# Patient Record
Sex: FEMALE | Race: WHITE | Employment: FULL TIME | URBAN - METROPOLITAN AREA
[De-identification: names, ages, dates, MRNs, and addresses within clinical notes are randomized per-mention and may not be internally consistent; named-entity substitution may affect disease eponyms.]

---

## 2019-11-19 LAB
HBV SURFACE AG SERPL QL IA: NEGATIVE
HIV 1+2 AB+HIV1 P24 AG SERPL QL IA: NEGATIVE
RUBELLA ABY IGG: NORMAL

## 2020-03-23 LAB — GLU GEST SCREEN 1HR 50G: 78

## 2020-05-26 ENCOUNTER — TRANSFERRED RECORDS (OUTPATIENT)
Dept: HEALTH INFORMATION MANAGEMENT | Facility: CLINIC | Age: 25
End: 2020-05-26

## 2020-05-26 ENCOUNTER — RECORDS - HEALTHEAST (OUTPATIENT)
Dept: LAB | Facility: CLINIC | Age: 25
End: 2020-05-26

## 2020-05-26 LAB — GROUP B STREP PCR: NEGATIVE

## 2020-05-27 LAB
ALLERGIC TO PENICILLIN: YES
GP B STREP DNA SPEC QL NAA+PROBE: NEGATIVE

## 2020-06-24 ENCOUNTER — ANESTHESIA (OUTPATIENT)
Dept: OBGYN | Facility: CLINIC | Age: 25
End: 2020-06-24
Payer: COMMERCIAL

## 2020-06-24 ENCOUNTER — ANESTHESIA EVENT (OUTPATIENT)
Dept: OBGYN | Facility: CLINIC | Age: 25
End: 2020-06-24
Payer: COMMERCIAL

## 2020-06-24 ENCOUNTER — HOSPITAL ENCOUNTER (INPATIENT)
Facility: CLINIC | Age: 25
LOS: 2 days | Discharge: HOME HEALTH | End: 2020-06-26
Attending: MIDWIFE | Admitting: MIDWIFE
Payer: COMMERCIAL

## 2020-06-24 DIAGNOSIS — O34.219 VBAC (VAGINAL BIRTH AFTER CESAREAN): ICD-10-CM

## 2020-06-24 DIAGNOSIS — D62 ANEMIA DUE TO BLOOD LOSS, ACUTE: Primary | ICD-10-CM

## 2020-06-24 PROBLEM — Z67.91 RH NEGATIVE STATUS DURING PREGNANCY: Status: ACTIVE | Noted: 2020-06-24

## 2020-06-24 PROBLEM — O26.899 RH NEGATIVE STATUS DURING PREGNANCY: Status: ACTIVE | Noted: 2020-06-24

## 2020-06-24 PROBLEM — O09.90 HIGH-RISK PREGNANCY, UNSPECIFIED TRIMESTER: Status: ACTIVE | Noted: 2020-06-24

## 2020-06-24 LAB
ABO + RH BLD: ABNORMAL
ABO + RH BLD: ABNORMAL
BASOPHILS # BLD AUTO: 0 10E9/L (ref 0–0.2)
BASOPHILS NFR BLD AUTO: 0.2 %
BLD GP AB INVEST PLASRBC-IMP: ABNORMAL
BLD GP AB SCN SERPL QL: ABNORMAL
BLOOD BANK CMNT PATIENT-IMP: ABNORMAL
BLOOD BANK CMNT PATIENT-IMP: ABNORMAL
BLOOD BANK CMNT PATIENT-IMP: NORMAL
DIFFERENTIAL METHOD BLD: NORMAL
EOSINOPHIL # BLD AUTO: 0.1 10E9/L (ref 0–0.7)
EOSINOPHIL NFR BLD AUTO: 1 %
ERYTHROCYTE [DISTWIDTH] IN BLOOD BY AUTOMATED COUNT: 13.1 % (ref 10–15)
ERYTHROCYTE [DISTWIDTH] IN BLOOD BY AUTOMATED COUNT: 13.2 % (ref 10–15)
HCT VFR BLD AUTO: 34.3 % (ref 35–47)
HCT VFR BLD AUTO: 39.9 % (ref 35–47)
HGB BLD-MCNC: 11.3 G/DL (ref 11.7–15.7)
HGB BLD-MCNC: 13.4 G/DL (ref 11.7–15.7)
IMM GRANULOCYTES # BLD: 0.1 10E9/L (ref 0–0.4)
IMM GRANULOCYTES NFR BLD: 0.5 %
LYMPHOCYTES # BLD AUTO: 2 10E9/L (ref 0.8–5.3)
LYMPHOCYTES NFR BLD AUTO: 19.5 %
MCH RBC QN AUTO: 29.7 PG (ref 26.5–33)
MCH RBC QN AUTO: 30.1 PG (ref 26.5–33)
MCHC RBC AUTO-ENTMCNC: 32.9 G/DL (ref 31.5–36.5)
MCHC RBC AUTO-ENTMCNC: 33.6 G/DL (ref 31.5–36.5)
MCV RBC AUTO: 89 FL (ref 78–100)
MCV RBC AUTO: 92 FL (ref 78–100)
MONOCYTES # BLD AUTO: 0.8 10E9/L (ref 0–1.3)
MONOCYTES NFR BLD AUTO: 7.5 %
NEUTROPHILS # BLD AUTO: 7.3 10E9/L (ref 1.6–8.3)
NEUTROPHILS NFR BLD AUTO: 71.3 %
NRBC # BLD AUTO: 0 10*3/UL
NRBC BLD AUTO-RTO: 0 /100
PLATELET # BLD AUTO: 147 10E9/L (ref 150–450)
PLATELET # BLD AUTO: 165 10E9/L (ref 150–450)
RBC # BLD AUTO: 3.75 10E12/L (ref 3.8–5.2)
RBC # BLD AUTO: 4.51 10E12/L (ref 3.8–5.2)
SARS-COV-2 PCR COMMENT: NORMAL
SARS-COV-2 RNA SPEC QL NAA+PROBE: NEGATIVE
SARS-COV-2 RNA SPEC QL NAA+PROBE: NORMAL
SPECIMEN EXP DATE BLD: ABNORMAL
SPECIMEN SOURCE: NORMAL
SPECIMEN SOURCE: NORMAL
T PALLIDUM AB SER QL: NONREACTIVE
WBC # BLD AUTO: 10.2 10E9/L (ref 4–11)
WBC # BLD AUTO: 16.1 10E9/L (ref 4–11)

## 2020-06-24 PROCEDURE — 86870 RBC ANTIBODY IDENTIFICATION: CPT | Performed by: MIDWIFE

## 2020-06-24 PROCEDURE — 72200001 ZZH LABOR CARE VAGINAL DELIVERY SINGLE

## 2020-06-24 PROCEDURE — 0HQ9XZZ REPAIR PERINEUM SKIN, EXTERNAL APPROACH: ICD-10-PCS | Performed by: ADVANCED PRACTICE MIDWIFE

## 2020-06-24 PROCEDURE — 10907ZC DRAINAGE OF AMNIOTIC FLUID, THERAPEUTIC FROM PRODUCTS OF CONCEPTION, VIA NATURAL OR ARTIFICIAL OPENING: ICD-10-PCS | Performed by: ADVANCED PRACTICE MIDWIFE

## 2020-06-24 PROCEDURE — 36415 COLL VENOUS BLD VENIPUNCTURE: CPT | Performed by: ADVANCED PRACTICE MIDWIFE

## 2020-06-24 PROCEDURE — 85025 COMPLETE CBC W/AUTO DIFF WBC: CPT | Performed by: MIDWIFE

## 2020-06-24 PROCEDURE — 86900 BLOOD TYPING SEROLOGIC ABO: CPT | Performed by: MIDWIFE

## 2020-06-24 PROCEDURE — 86901 BLOOD TYPING SEROLOGIC RH(D): CPT | Performed by: MIDWIFE

## 2020-06-24 PROCEDURE — 40000977 ZZH STATISTIC ATTENDANCE AT DELIVERY

## 2020-06-24 PROCEDURE — U0003 INFECTIOUS AGENT DETECTION BY NUCLEIC ACID (DNA OR RNA); SEVERE ACUTE RESPIRATORY SYNDROME CORONAVIRUS 2 (SARS-COV-2) (CORONAVIRUS DISEASE [COVID-19]), AMPLIFIED PROBE TECHNIQUE, MAKING USE OF HIGH THROUGHPUT TECHNOLOGIES AS DESCRIBED BY CMS-2020-01-R: HCPCS | Performed by: MIDWIFE

## 2020-06-24 PROCEDURE — 25000128 H RX IP 250 OP 636

## 2020-06-24 PROCEDURE — 12000001 ZZH R&B MED SURG/OB UMMC

## 2020-06-24 PROCEDURE — 25800030 ZZH RX IP 258 OP 636: Performed by: ADVANCED PRACTICE MIDWIFE

## 2020-06-24 PROCEDURE — 85027 COMPLETE CBC AUTOMATED: CPT | Performed by: ADVANCED PRACTICE MIDWIFE

## 2020-06-24 PROCEDURE — 25000125 ZZHC RX 250

## 2020-06-24 PROCEDURE — 86780 TREPONEMA PALLIDUM: CPT | Performed by: MIDWIFE

## 2020-06-24 PROCEDURE — 86850 RBC ANTIBODY SCREEN: CPT | Performed by: MIDWIFE

## 2020-06-24 PROCEDURE — 00HU33Z INSERTION OF INFUSION DEVICE INTO SPINAL CANAL, PERCUTANEOUS APPROACH: ICD-10-PCS | Performed by: ANESTHESIOLOGY

## 2020-06-24 PROCEDURE — 25000132 ZZH RX MED GY IP 250 OP 250 PS 637: Performed by: MIDWIFE

## 2020-06-24 PROCEDURE — 3E0R3BZ INTRODUCTION OF ANESTHETIC AGENT INTO SPINAL CANAL, PERCUTANEOUS APPROACH: ICD-10-PCS | Performed by: ANESTHESIOLOGY

## 2020-06-24 PROCEDURE — 25800030 ZZH RX IP 258 OP 636: Performed by: MIDWIFE

## 2020-06-24 PROCEDURE — 25000125 ZZHC RX 250: Performed by: ANESTHESIOLOGY

## 2020-06-24 PROCEDURE — 36415 COLL VENOUS BLD VENIPUNCTURE: CPT | Performed by: MIDWIFE

## 2020-06-24 RX ORDER — AMOXICILLIN 250 MG
2 CAPSULE ORAL 2 TIMES DAILY
Status: DISCONTINUED | OUTPATIENT
Start: 2020-06-24 | End: 2020-06-26 | Stop reason: HOSPADM

## 2020-06-24 RX ORDER — OXYTOCIN/0.9 % SODIUM CHLORIDE 30/500 ML
PLASTIC BAG, INJECTION (ML) INTRAVENOUS
Status: COMPLETED
Start: 2020-06-24 | End: 2020-06-24

## 2020-06-24 RX ORDER — HYDROXYZINE HYDROCHLORIDE 50 MG/1
150 TABLET, FILM COATED ORAL EVERY 6 HOURS PRN
Status: DISCONTINUED | OUTPATIENT
Start: 2020-06-24 | End: 2020-06-24

## 2020-06-24 RX ORDER — CARBOPROST TROMETHAMINE 250 UG/ML
250 INJECTION, SOLUTION INTRAMUSCULAR
Status: DISCONTINUED | OUTPATIENT
Start: 2020-06-24 | End: 2020-06-24

## 2020-06-24 RX ORDER — ACETAMINOPHEN 325 MG/1
650 TABLET ORAL EVERY 4 HOURS PRN
Status: DISCONTINUED | OUTPATIENT
Start: 2020-06-24 | End: 2020-06-26 | Stop reason: HOSPADM

## 2020-06-24 RX ORDER — NALOXONE HYDROCHLORIDE 0.4 MG/ML
.1-.4 INJECTION, SOLUTION INTRAMUSCULAR; INTRAVENOUS; SUBCUTANEOUS
Status: DISCONTINUED | OUTPATIENT
Start: 2020-06-24 | End: 2020-06-26 | Stop reason: HOSPADM

## 2020-06-24 RX ORDER — OXYTOCIN 10 [USP'U]/ML
10 INJECTION, SOLUTION INTRAMUSCULAR; INTRAVENOUS
Status: DISCONTINUED | OUTPATIENT
Start: 2020-06-24 | End: 2020-06-24

## 2020-06-24 RX ORDER — SODIUM CHLORIDE, SODIUM LACTATE, POTASSIUM CHLORIDE, CALCIUM CHLORIDE 600; 310; 30; 20 MG/100ML; MG/100ML; MG/100ML; MG/100ML
INJECTION, SOLUTION INTRAVENOUS CONTINUOUS
Status: DISCONTINUED | OUTPATIENT
Start: 2020-06-24 | End: 2020-06-24

## 2020-06-24 RX ORDER — NALOXONE HYDROCHLORIDE 0.4 MG/ML
.1-.4 INJECTION, SOLUTION INTRAMUSCULAR; INTRAVENOUS; SUBCUTANEOUS
Status: DISCONTINUED | OUTPATIENT
Start: 2020-06-24 | End: 2020-06-24

## 2020-06-24 RX ORDER — OXYCODONE AND ACETAMINOPHEN 5; 325 MG/1; MG/1
1 TABLET ORAL
Status: DISCONTINUED | OUTPATIENT
Start: 2020-06-24 | End: 2020-06-24

## 2020-06-24 RX ORDER — IBUPROFEN 800 MG/1
800 TABLET, FILM COATED ORAL
Status: COMPLETED | OUTPATIENT
Start: 2020-06-24 | End: 2020-06-24

## 2020-06-24 RX ORDER — FENTANYL/BUPIVACAINE/NS/PF 2-1250MCG
PLASTIC BAG, INJECTION (ML) INJECTION
Status: COMPLETED
Start: 2020-06-24 | End: 2020-06-24

## 2020-06-24 RX ORDER — LIDOCAINE HYDROCHLORIDE 10 MG/ML
INJECTION, SOLUTION EPIDURAL; INFILTRATION; INTRACAUDAL; PERINEURAL
Status: COMPLETED
Start: 2020-06-24 | End: 2020-06-24

## 2020-06-24 RX ORDER — OXYTOCIN/0.9 % SODIUM CHLORIDE 30/500 ML
340 PLASTIC BAG, INJECTION (ML) INTRAVENOUS CONTINUOUS PRN
Status: DISCONTINUED | OUTPATIENT
Start: 2020-06-24 | End: 2020-06-26 | Stop reason: HOSPADM

## 2020-06-24 RX ORDER — ONDANSETRON 2 MG/ML
4 INJECTION INTRAMUSCULAR; INTRAVENOUS EVERY 6 HOURS PRN
Status: DISCONTINUED | OUTPATIENT
Start: 2020-06-24 | End: 2020-06-24

## 2020-06-24 RX ORDER — AMOXICILLIN 250 MG
1 CAPSULE ORAL 2 TIMES DAILY
Status: DISCONTINUED | OUTPATIENT
Start: 2020-06-24 | End: 2020-06-26 | Stop reason: HOSPADM

## 2020-06-24 RX ORDER — NALBUPHINE HYDROCHLORIDE 20 MG/ML
2.5-5 INJECTION, SOLUTION INTRAMUSCULAR; INTRAVENOUS; SUBCUTANEOUS EVERY 6 HOURS PRN
Status: DISCONTINUED | OUTPATIENT
Start: 2020-06-24 | End: 2020-06-24

## 2020-06-24 RX ORDER — EPHEDRINE SULFATE 50 MG/ML
5 INJECTION, SOLUTION INTRAMUSCULAR; INTRAVENOUS; SUBCUTANEOUS
Status: DISCONTINUED | OUTPATIENT
Start: 2020-06-24 | End: 2020-06-24

## 2020-06-24 RX ORDER — BISACODYL 10 MG
10 SUPPOSITORY, RECTAL RECTAL DAILY PRN
Status: DISCONTINUED | OUTPATIENT
Start: 2020-06-26 | End: 2020-06-26 | Stop reason: HOSPADM

## 2020-06-24 RX ORDER — LIDOCAINE HYDROCHLORIDE AND EPINEPHRINE 15; 5 MG/ML; UG/ML
INJECTION, SOLUTION EPIDURAL PRN
Status: DISCONTINUED | OUTPATIENT
Start: 2020-06-24 | End: 2020-06-24 | Stop reason: HOSPADM

## 2020-06-24 RX ORDER — MISOPROSTOL 200 UG/1
TABLET ORAL
Status: DISCONTINUED
Start: 2020-06-24 | End: 2020-06-24 | Stop reason: HOSPADM

## 2020-06-24 RX ORDER — OXYTOCIN/0.9 % SODIUM CHLORIDE 30/500 ML
100 PLASTIC BAG, INJECTION (ML) INTRAVENOUS CONTINUOUS
Status: DISCONTINUED | OUTPATIENT
Start: 2020-06-24 | End: 2020-06-26 | Stop reason: HOSPADM

## 2020-06-24 RX ORDER — VITAMIN A ACETATE, .BETA.-CAROTENE, ASCORBIC ACID, CHOLECALCIFEROL, .ALPHA.-TOCOPHEROL ACETATE, DL-, THIAMINE MONONITRATE, RIBOFLAVIN, NIACINAMIDE, PYRIDOXINE HYDROCHLORIDE, FOLIC ACID, CYANOCOBALAMIN, CALCIUM CARBONATE, FERROUS FUMARATE, ZINC OXIDE, AND CUPRIC OXIDE 2000; 2000; 120; 400; 22; 1.84; 3; 20; 10; 1; 12; 200; 27; 25; 2 [IU]/1; [IU]/1; MG/1; [IU]/1; MG/1; MG/1; MG/1; MG/1; MG/1; MG/1; UG/1; MG/1; MG/1; MG/1; MG/1
1 TABLET ORAL DAILY
COMMUNITY

## 2020-06-24 RX ORDER — METHYLERGONOVINE MALEATE 0.2 MG/ML
200 INJECTION INTRAVENOUS
Status: DISCONTINUED | OUTPATIENT
Start: 2020-06-24 | End: 2020-06-24

## 2020-06-24 RX ORDER — OXYTOCIN 10 [USP'U]/ML
10 INJECTION, SOLUTION INTRAMUSCULAR; INTRAVENOUS
Status: DISCONTINUED | OUTPATIENT
Start: 2020-06-24 | End: 2020-06-26 | Stop reason: HOSPADM

## 2020-06-24 RX ORDER — LIDOCAINE 40 MG/G
CREAM TOPICAL
Status: DISCONTINUED | OUTPATIENT
Start: 2020-06-24 | End: 2020-06-24

## 2020-06-24 RX ORDER — OXYCODONE HYDROCHLORIDE 5 MG/1
5 TABLET ORAL EVERY 4 HOURS PRN
Status: DISCONTINUED | OUTPATIENT
Start: 2020-06-24 | End: 2020-06-26 | Stop reason: HOSPADM

## 2020-06-24 RX ORDER — HYDROCORTISONE 2.5 %
CREAM (GRAM) TOPICAL 3 TIMES DAILY PRN
Status: DISCONTINUED | OUTPATIENT
Start: 2020-06-24 | End: 2020-06-26 | Stop reason: HOSPADM

## 2020-06-24 RX ORDER — ACETAMINOPHEN 325 MG/1
650 TABLET ORAL EVERY 4 HOURS PRN
Status: DISCONTINUED | OUTPATIENT
Start: 2020-06-24 | End: 2020-06-24

## 2020-06-24 RX ORDER — MODIFIED LANOLIN
OINTMENT (GRAM) TOPICAL
Status: DISCONTINUED | OUTPATIENT
Start: 2020-06-24 | End: 2020-06-26 | Stop reason: HOSPADM

## 2020-06-24 RX ORDER — OXYTOCIN/0.9 % SODIUM CHLORIDE 30/500 ML
100-340 PLASTIC BAG, INJECTION (ML) INTRAVENOUS CONTINUOUS PRN
Status: COMPLETED | OUTPATIENT
Start: 2020-06-24 | End: 2020-06-24

## 2020-06-24 RX ORDER — FENTANYL CITRATE 50 UG/ML
50-100 INJECTION, SOLUTION INTRAMUSCULAR; INTRAVENOUS
Status: DISCONTINUED | OUTPATIENT
Start: 2020-06-24 | End: 2020-06-24

## 2020-06-24 RX ORDER — OXYTOCIN 10 [USP'U]/ML
INJECTION, SOLUTION INTRAMUSCULAR; INTRAVENOUS
Status: DISCONTINUED
Start: 2020-06-24 | End: 2020-06-24 | Stop reason: HOSPADM

## 2020-06-24 RX ORDER — IBUPROFEN 800 MG/1
800 TABLET, FILM COATED ORAL EVERY 6 HOURS PRN
Status: DISCONTINUED | OUTPATIENT
Start: 2020-06-24 | End: 2020-06-26 | Stop reason: HOSPADM

## 2020-06-24 RX ORDER — OXYTOCIN/0.9 % SODIUM CHLORIDE 30/500 ML
1-24 PLASTIC BAG, INJECTION (ML) INTRAVENOUS CONTINUOUS
Status: DISCONTINUED | OUTPATIENT
Start: 2020-06-24 | End: 2020-06-24

## 2020-06-24 RX ADMIN — IBUPROFEN 800 MG: 800 TABLET, FILM COATED ORAL at 20:48

## 2020-06-24 RX ADMIN — LIDOCAINE HYDROCHLORIDE 20 ML: 10 INJECTION, SOLUTION EPIDURAL; INFILTRATION; INTRACAUDAL; PERINEURAL at 20:05

## 2020-06-24 RX ADMIN — LIDOCAINE HYDROCHLORIDE,EPINEPHRINE BITARTRATE 3 ML: 15; .005 INJECTION, SOLUTION EPIDURAL; INFILTRATION; INTRACAUDAL; PERINEURAL at 16:45

## 2020-06-24 RX ADMIN — Medication: at 16:47

## 2020-06-24 RX ADMIN — Medication 8 ML: at 16:45

## 2020-06-24 RX ADMIN — HYDROXYZINE HYDROCHLORIDE 150 MG: 50 TABLET, FILM COATED ORAL at 05:22

## 2020-06-24 RX ADMIN — Medication 340 ML/HR: at 20:00

## 2020-06-24 RX ADMIN — SODIUM CHLORIDE, POTASSIUM CHLORIDE, SODIUM LACTATE AND CALCIUM CHLORIDE: 600; 310; 30; 20 INJECTION, SOLUTION INTRAVENOUS at 19:07

## 2020-06-24 RX ADMIN — SODIUM CHLORIDE, POTASSIUM CHLORIDE, SODIUM LACTATE AND CALCIUM CHLORIDE 1000 ML: 600; 310; 30; 20 INJECTION, SOLUTION INTRAVENOUS at 16:46

## 2020-06-24 ASSESSMENT — MIFFLIN-ST. JEOR: SCORE: 1587.3

## 2020-06-24 NOTE — PROGRESS NOTES
"Blood pressure 139/77, pulse 77, temperature 98.1  F (36.7  C), temperature source Oral, resp. rate 18, height 1.626 m (5' 4\"), weight 85.7 kg (189 lb).  Patient Vitals for the past 24 hrs:   BP Temp Temp src Pulse Resp Height Weight   20 0525 139/77 -- -- -- 18 -- --   20 0431 138/84 98.1  F (36.7  C) Oral 77 18 1.626 m (5' 4\") 85.7 kg (189 lb)     General appearance: Pt sleeping after vistaril  CONTRACTIONS: every 5-10 minutes  Pitocin- none,  Antibiotics- none  FETAL HEART TONES: continuous EFM- baseline 125 with moderate variability and positive accelerations. No decelerations.  ROM: not ruptured  PELVIC EXAM:deferred    # Pain Assessment:  Current Pain Score 2020   Patient currently in pain? sleeping: patient not able to self report     ASSESSMENT:  ==============  IUP @ 40w5d, early labor   Fetal Heart Rate Tracing category one  GBS- negative    TOLAC- consent signed    S/p PO vistaril @ 0522    [facility]     Patient Active Problem List   Diagnosis     Labor and delivery, indication for care     High-risk pregnancy, unspecified trimester     Previous  section complicating pregnancy     Rh negative status during pregnancy     PLAN:  ===========  Pt resting s/p vistaril administration.   Will discuss plan of care when patient awake.    AKILA Kenny, CNM   "

## 2020-06-24 NOTE — PROVIDER NOTIFICATION
06/24/20 0832   Provider Notification   Provider Name/Title MARIA C Walters   Method of Notification At Bedside   Request Evaluate in Person   Notification Reason Status Update   Provider at bedside to discuss labor status and possible augmentation of labor.

## 2020-06-24 NOTE — ANESTHESIA PROCEDURE NOTES
Epidural Procedure Note  Staff -   Anesthesiologist:  Dulce Yusuf MD      Performed By: anesthesiologist          Location: OB     Procedure start time:  6/24/2020 4:30 PM     Procedure end time:  6/24/2020 4:46 PM   Pre-procedure checklist:   patient identified, IV checked, site marked, risks and benefits discussed, informed consent, monitors and equipment checked, pre-op evaluation, at physician/surgeon's request and post-op pain management      Correct Patient: Yes      Correct Position: Yes      Correct Site: Yes      Correct Procedure: Yes      Correct Laterality:  Yes    Site Marked:  Yes  Procedure:     Procedure:  Epidural catheter    ASA:  2    Position:  Sitting    Sterile Prep: Betadine      Insertion site:  L4-5    Local skin infiltration:  1% lidocaine    Approach:  Midline    Needle gauge (G):  18    Block Needle Type:  Touhy    Injection Technique:  LORT saline    DEVORA at (cm):  7.5    Attempts:  2    Redirects:  0    Catheter gauge (G):  20    Catheter threaded easily: Yes      Threaded to cm at skin:  12    Threaded in epidural space (cm):  5    Paresthesias:  No    Aspiration negative for Heme or CSF: Yes      Test dose (mL):  3     Local anesthetic:  Lidocaine 1.5% w/ 1:200,000 epinephrine    Test dose time:  04:39    Test dose negative for signs of intravascular, subdural or intrathecal injection: Yes    Assessment/Narrative:      Easily placed. No acute complications

## 2020-06-24 NOTE — ANESTHESIA PREPROCEDURE EVALUATION
"Anesthesia Pre-Procedure Evaluation    Patient: Paul Oh   MRN:     [id number] Gender:   female   Age:    24 year old :      1995        Preoperative Diagnosis: * No surgery found *        LABS:  CBC:   Lab Results   Component Value Date    WBC 10.2 2020    HGB 13.4 2020    HCT 39.9 2020     2020     BMP: No results found for: NA, POTASSIUM, CHLORIDE, CO2, BUN, CR, GLC  COAGS: No results found for: PTT, INR, FIBR  POC: No results found for: BGM, HCG, HCGS  OTHER: No results found for: PH, LACT, A1C, VEE, PHOS, MAG, ALBUMIN, PROTTOTAL, ALT, AST, GGT, ALKPHOS, BILITOTAL, BILIDIRECT, LIPASE, AMYLASE, EVAN, TSH, T4, T3, CRP, SED     Preop Vitals    BP Readings from Last 3 Encounters:   20 108/68    Pulse Readings from Last 3 Encounters:   20 77      Resp Readings from Last 3 Encounters:   20 18    SpO2 Readings from Last 3 Encounters:   No data found for SpO2      Temp Readings from Last 1 Encounters:   20 36.7  C (98  F) (Oral)    Ht Readings from Last 1 Encounters:   20 1.626 m (5' 4\")      Wt Readings from Last 1 Encounters:   20 85.7 kg (189 lb)    Estimated body mass index is 32.44 kg/m  as calculated from the following:    Height as of this encounter: 1.626 m (5' 4\").    Weight as of this encounter: 85.7 kg (189 lb).     LDA:  Peripheral IV 20 Left;Dorsal Hand (Active)   Site Assessment WDL 20 1330   Line Status Saline locked 20 1330   Phlebitis Scale 0-->no symptoms 20 1330   Infiltration Scale 0 20 1330   Number of days: 0        History reviewed. No pertinent past medical history.   Past Surgical History:   Procedure Laterality Date     ABDOMEN SURGERY       2017      Allergies   Allergen Reactions     Amoxicillin Anaphylaxis     Penicillins Anaphylaxis     PN: Face & throat swelling  PN: Face & throat swelling          Anesthesia Evaluation     .             ROS/MED HX    ENT/Pulmonary:  - " neg pulmonary ROS     Neurologic:  - neg neurologic ROS     Cardiovascular:  - neg cardiovascular ROS       METS/Exercise Tolerance:     Hematologic:         Musculoskeletal:         GI/Hepatic:  - neg GI/hepatic ROS       Renal/Genitourinary:         Endo:         Psychiatric:         Infectious Disease:         Malignancy:         Other:                     DEMARCUS FV AN PHYSICAL EXAM    DEMARCUS FV AN PLAN NO PONV RULE    neg OB ROS             Dulce Blanc MD

## 2020-06-24 NOTE — PROVIDER NOTIFICATION
06/24/20 1840   Provider Notification   Provider Name/Title Aly Mast MARIA C   Method of Notification At Bedside   Request Evaluate in Person   Notification Reason SVE;Status Update   Provider called into room because feels like she needs to push. Cervix complete. Pushing started. Pt repositioned side to side for decels. Fluid bolus started. Monitoring FHR closely. Provider continuous at bedside.

## 2020-06-24 NOTE — PROGRESS NOTES
"Blood pressure 108/68, pulse 77, temperature 98  F (36.7  C), temperature source Oral, resp. rate 18, height 1.626 m (5' 4\"), weight 85.7 kg (189 lb).  Patient Vitals for the past 24 hrs:   BP Temp Temp src Pulse Resp Height Weight   20 1330 108/68 98  F (36.7  C) Oral -- 18 -- --   20 0850 119/79 97.7  F (36.5  C) Oral -- 18 -- --   20 0525 139/77 -- -- -- 18 -- --   20 0431 138/84 98.1  F (36.7  C) Oral 77 18 1.626 m (5' 4\") 85.7 kg (189 lb)     General appearance: Pt exhausted; tearful. Requesting SVE in order to guide her decision making. Pt states that if she is close to pushing, she would like to try a dose of IV fentanyl. If her exam is similar or the same, she would like an epidural.   CONTRACTIONS: every 3-6 minutes  Pitocin- none,  Antibiotics- none  FETAL HEART TONES: continuous EFM- baseline 140 with moderate variability and positive accelerations. Variable decel x 2; rare.  ROM: clear fluid @ 1510  PELVIC EXAM: /-1 to 0 with ctx    # Pain Assessment:  Current Pain Score 2020   Patient currently in pain? yes   - Paul is experiencing pain due to contractions, pressure. Pain management was discussed with Paul and her spouse  And  and the plan was created in a collaborative fashion.  Paul's response to the current recommendations: engaged  - Considering options      ASSESSMENT:  ==============  IUP @ 40w5d, labor  Fetal Heart Rate Tracing category two d/t rare variable decels  GBS- negative     Cervix unchanged  AROM @ 1510, clear fluid, afebrile     TOLAC- consent signed     [facility]      Patient Active Problem List   Diagnosis     Labor and delivery, indication for care     High-risk pregnancy, unspecified trimester     Previous  section complicating pregnancy     Rh negative status during pregnancy     PLAN:  ===========  Reviewed cervical exam and that contractions have spaced in frequency.   Recommended augmentation with pitocin.   Pt desires an " epidural. Would like to have it placed prior to initiation of pitocin.  IVF bolus started.  Anesthesia notified and provider to provider report given.    AKILA Kenny CNM     Addendum @ 1700:    Sat well for epidural placement. More amniotic fluid noted when pt repositioned to supine- clear.  Now comfortable with epidural.    Encouraged rest.   Reviewed recommendation for pitocin if contractions not in a regular pattern. Pt agreeable.     Category 1 fetal tracing. Ctx q3-4min.  Will continue to monitor.    AKILA Kenny, MARIA C

## 2020-06-24 NOTE — PROGRESS NOTES
"Blood pressure 139/77, pulse 77, temperature 98.1  F (36.7  C), temperature source Oral, resp. rate 18, height 1.626 m (5' 4\"), weight 85.7 kg (189 lb).  Patient Vitals for the past 24 hrs:   BP Temp Temp src Pulse Resp Height Weight   20 0525 139/77 -- -- -- 18 -- --   20 0431 138/84 98.1  F (36.7  C) Oral 77 18 1.626 m (5' 4\") 85.7 kg (189 lb)     General appearance: Pt awake, had breakfast. Feels that the little rest she had was helpful. Alexis uncomfortably; although notes contractions have spaced. Breathing through contractions,  supportive massaging her back.  providing support at bedside.  Feels contractions are wrapping around to her back.  Agreeable to sve and discussion of plan of care.    Reviewed previous labor and delivery- pt reports she was induced 2 days after her due date. Progressed to 8-9cm, fetal station very low. On pitocin. C/S recommended for category 2 fetal tracing. Baby was OP.    CONTRACTIONS: every 5-10 minutes  Pitocin- none,  Antibiotics- none  FETAL HEART TONES: continuous EFM- baseline 125 with moderate variability and positive accelerations. No decelerations.  ROM: not ruptured  PELVIC EXAM: @ 0844:  4/80/-3, mid/soft    Branham score =    Membrane sweep performed with pt consent. Cervix stretchy to 5 with sweep.   Small amt of bloody show    Ultrasound performed- appears OP.     ASSESSMENT:  ==============  IUP @ 40w5d, early labor   Fetal Heart Rate Tracing category one  GBS- negative    TOLAC- consent signed    Cervical change @ 0844- 4/80/-3, mid/soft   ([facility] exam 3cm)     S/p PO vistaril @ 0522     [facility]      Patient Active Problem List   Diagnosis     Labor and delivery, indication for care     High-risk pregnancy, unspecified trimester     Previous  section complicating pregnancy     Rh negative status during pregnancy     PLAN:  ===========  - Reviewed pt with Dr. Nance at safety rounds- recommended continued admission, " augmentation prn.   - Reviewed change in cervical exam but space in contractions with patient.  - Discussed expectant management vs active management.  - Discussed that active management at this time would involve pitocin titration.  - Reviewed expectant management is also reasonable as FHR is category 1, intact, cervical change noted.   - Pt desires expectant management at this time. Agreeable to reassessment in 4 hours or prn for maternal/fetal indications.   - Ambulation, birthing ball, CUB, hydrotherapy encouraged.  - Reviewed hx of OP and OP position noted on us today. Recommended  position changes, side lying release, sifting/rebozo throughout labor course.   - Continuous EFM and TOCO d/t TOLAC.     Reevaluate in 4 hours for augmentation or prn.     Romeo Diaz, AKILA, CNM

## 2020-06-24 NOTE — PROGRESS NOTES
"Blood pressure 108/68, pulse 77, temperature 98  F (36.7  C), temperature source Oral, resp. rate 18, height 1.626 m (5' 4\"), weight 85.7 kg (189 lb).  Patient Vitals for the past 24 hrs:   BP Temp Temp src Pulse Resp Height Weight   06/24/20 1330 108/68 98  F (36.7  C) Oral -- 18 -- --   06/24/20 0850 119/79 97.7  F (36.5  C) Oral -- 18 -- --   06/24/20 0525 139/77 -- -- -- 18 -- --   06/24/20 0431 138/84 98.1  F (36.7  C) Oral 77 18 1.626 m (5' 4\") 85.7 kg (189 lb)     General appearance: Pt was laying in left side lying with peanut ball, then right side lying with peanut ball. Approx 1 hour in each side.   Repositioned to hands and knees using cub. Reports feeling a lot of pressure during contractions; pushing feeling. Some mild pressure remains between contractions. Low moaning sounds, breathing well through contractions while using nitrous oxide. Resting in between. Great support from partner and . Sifting done.     CONTRACTIONS: continues to be difficult to trace with toco despite multiple repositioning attempts; RN and CNM in room, observing and palpating contractions q3-5 minutes. Range from mild to moderate to palpation.    Pitocin- none,  Antibiotics- none    FETAL HEART TONES: continuous EFM- baseline 135 with moderate variability and positive accelerations. Rare variable decelerations, resolve spontaneously.     ROM: not ruptured  PELVIC EXAM: 6/90/-1 to 0, ant/soft    BOW palpated, not bulging    # Pain Assessment:  Current Pain Score 6/24/2020   Patient currently in pain? yes   - Paul is experiencing pain due to contractions, pressure. Pain management was discussed with Paul and her spouse and  and the plan was created in a collaborative fashion.  Paul's response to the current recommendations: engaged  - Using nitrous oxide      ASSESSMENT:  ==============  IUP @ 40w5d, labor  Fetal Heart Rate Tracing category two d/t rare variable decels; overall cat 1 and currently category " 1  GBS- negative     Slow progress but with cervical change     TOLAC- consent signed     [facility]     Patient Active Problem List   Diagnosis     Labor and delivery, indication for care     High-risk pregnancy, unspecified trimester     Previous  section complicating pregnancy     Rh negative status during pregnancy     PLAN:  ===========  Reviewed cervical exam. Contractions q3-5 minutes   Pt crying/tearful after exam- feeling very tired.  Discussed option of AROM to facilitate labor progress and fetal descent.   Pt would like to think about it/discuss with partner and .   Pt up to bathroom; will go in throne position when returns.   Using nitrous oxide.   Continuous efm and toco.  Reevaluate prn per maternal/fetal indications. AROM if pt agreeable.    AKILA Kenny, MARIA C     Addendum @ 1520:    Pt agreeable to AROM.     AROM performed with pt consent after review of r/b/a.  Return of small amount of pink tinged fluid.     Contraction followed AROM, head well applied to cervix.    Continue to monitor closely; provide labor support.   Pt repositioned to throne.    AKILA Kenny, MARIA C

## 2020-06-24 NOTE — PLAN OF CARE
BP in mild range - denies HA, vision changes or epigastric pain. Resting comfortably after vistaril. Contractions have spaced, FHT reactive. Support person present.

## 2020-06-24 NOTE — PROVIDER NOTIFICATION
Data: Patient presented to [facility] at 0345.   Reason for maternal/fetal assessment per patient is Laboring  .  Patient is a . Prenatal record reviewed.      OB History    Para Term  AB Living   3 2 1 1 0 1   SAB TAB Ectopic Multiple Live Births   0 0 0 0 1      # Outcome Date GA Lbr Anthony/2nd Weight Sex Delivery Anes PTL Lv   3 Current            2   21w0d    Vag-Spont   FD   1 Term  40w2d   M CS-LTranv   CHELO   . Medical history: History reviewed. No pertinent past medical history. History of 1 prior . Gestational Age 40w5d. VSS. Fetal movement present. Patient denies vaginal discharge, pelvic pressure, UTI symptoms, GI problems, bloody show, vaginal bleeding, edema, headache, visual disturbances, epigastric or URQ pain, rupture of membranes. C/o prodromal labor starting Friday. Pt stated contractions became more intense and regular at approx 1530 . Per report of home birth midwife, baby required further EFM, thus reason for pt stated transfer. Support person present.  Action: Verbal consent for EFM. Triage assessment completed. EFM applied for fetal well-being. Uterine assessment done via TOCO. Fetal assessment: Presumed adequate fetal oxygenation documented (see flow record).   Response: Lou Murrell CNM informed of pt arrival. Plan per provider is admit pt. Pt would like vistaril and to rest prior to repeat cervical exam. Patient verbalized agreement with plan. Patient transferred to room 444 ambulatory, oriented to room and call light.

## 2020-06-24 NOTE — H&P
ADMIT NOTE  =================      Paul Oh is a 24 year old female with an No LMP recorded. Patient is pregnant.  40 w 5 days and Estimated Date of Delivery:20   [facility] 2020 at 4:07 AM with in early labor. Which begain around 1500   Pt reports irregualr on and off contractions since Friday evening  Interrupted sleep      HPI  ================    Patient with planned Out of Hospital Birth is transferring by car to the hospital for fetal monitoring   Patient has been seen by [facility] for prenatal care.  Transferring midwife name(s),/birth center & phone number: JORDYN   Midwife here supporting patient: not here due to COVID   Records received, reviewed and scanned into chart.   [facility] midwife called CNM reporting ? decels in FHR requested transfer to [facility] for continuous monitoring FHR assessment    Pt reports contractions on and off since Friday but became more regular and progressive at 1500 on       Contractions- moderate  Fetal movement- active  ROM- no   Vaginal bleeding- none  GBS- negative  FOB- is involved, CLARI  Other labor support- Joan    Weight gain- 189 - 135 lbs, Total weight gain- 54 lbs  Height- 5'4  BMI- 23  First prenatal visit at 10 weeks,x6    At [facility] to [facility] at 29 wks x 6Total visits- 12    PROBLEM LIST  =================  Patient Active Problem List    Diagnosis Date Noted     Labor and delivery, indication for care 2020     Priority: Medium       HISTORIES  ============  Allergies not on file  No past medical history on file.  No past surgical history on file..  No family history on file.  Social History     Tobacco Use     Smoking status: Not on file   Substance Use Topics     Alcohol use: Not on file     OB History    Para Term  AB Living   3 2 1 1 0 1   SAB TAB Ectopic Multiple Live Births   0 0 0 0 1      # Outcome Date GA Lbr Anthony/2nd Weight Sex Delivery Anes PTL Lv   3 Current            2  " 2019 21w0d    Vag-Spont   FD   1 Term 2017 40w2d   M CS-LTranv   CHELO      Hx GTN 1st pregnancy  C/S at 8 cm for Fetal intolerance   LABS:   ===========  Prenatal Labs reviewed per transfer records:   Rhogam indicated and given on 20  ABO/ RH: A NEG    Rubella immune   HBsAg: negative   HIV: negative   RPR: NR  GCT: date result ? \"passed\"   GBS: , result *NEG  OTHE    ULTRASOUND  =============  Date 19, result 11w1d  MFM US 3/9/20 plac ant normal scan  ROS  =========  Pt denies significant respiratory, cardiovacular, GI, or muscular/skeletalcomplaints.    See RN data base ROS.       PHYSICAL EXAM:  ===============  There were no vitals taken for this visit.  General appearance: uncomfortable with contractions  GENERAL APPEARANCE: healthy, alert and no distress  RESP: lungs clear to auscultation - no rales, rhonchi or wheezes  CV: regular rates and rhythm, normal S1 S2, no S3 or S4 and no murmur,and no varicosities  ABDOMEN:  soft, nontender, no epigastric pain  SKIN: no suspicious lesions or rashes  NEURO: Denies headache, blurred vision, other vision changes  PSYCH: mentation appears normal. and affect normal/bright  Legs: Reflexes normal bilaterally     Abdomen: gravid, vertex fetus per Leopold's, non-tender between contractions.   Cephalic presentation confirmed by BSUS  EFW-  7 1/4 lbs.   CONTACTIONS: moderate q 3-6 x 60   FETAL HEART TONES: continuous EFM- baseline 145 with moderate variability and positive accelerations. No decelerations.  PELVIC EXAM:deferred  Declined on admission last exam 01 3cm/90/-2 per CPM   BLOODY SHOW: no   ROM:no  FLUID: none  ROMPLUS: not done    # Pain Assessment:   - Paul is experiencing pain due to labor . Pain management was discussed with Paul and her spouse and the plan was created in a collaborative fashion.  Paul's response to the current recommendations: engaged  - Please see the plan for pain management as documented above  Pt requesting meds to " facilitate rest now  Vistaril only  Declining morphine        ASSESSMENT:  ==============  35 yo   IUP @ 40w5 admitted in early labor  TOLAC    NST REACTIVE  Fetal Heart Rate - category one  GBS- negative    PLAN:  ===========  TOLAC consent reviewed and signed by pt    Admit - see IP orders  Pain medication options reviewed. Pt is interested in sleep meds now  Vistaril only po  Declining morphine inj.    Consider augmentation or AROM prn after resting   MD consultant on call / available prn  Ambulation, hydration, position changes, birthing ball and tub options to facilitate labor reviewed with pt .  AKILA RibeiroM

## 2020-06-24 NOTE — PROGRESS NOTES
"Blood pressure 128/73, pulse 77, temperature 98  F (36.7  C), temperature source Oral, resp. rate 18, height 1.626 m (5' 4\"), weight 85.7 kg (189 lb), SpO2 98 %.  Patient Vitals for the past 24 hrs:   BP Temp Temp src Pulse Resp SpO2 Height Weight   06/24/20 1724 128/73 -- -- -- -- -- -- --   06/24/20 1721 -- -- -- -- -- 98 % -- --   06/24/20 1711 -- -- -- -- -- 98 % -- --   06/24/20 1710 127/70 -- -- -- -- -- -- --   06/24/20 1700 118/66 -- -- -- -- 96 % -- --   06/24/20 1651 -- -- -- -- -- 99 % -- --   06/24/20 1649 122/60 -- -- -- -- -- -- --   06/24/20 1643 127/67 -- -- -- -- -- -- --   06/24/20 1641 125/66 98  F (36.7  C) Oral -- 18 99 % -- --   06/24/20 1639 131/68 -- -- -- -- -- -- --   06/24/20 1637 128/72 -- -- -- -- -- -- --   06/24/20 1636 -- -- -- -- -- 100 % -- --   06/24/20 1635 120/73 -- -- -- -- -- -- --   06/24/20 1633 133/70 -- -- -- -- -- -- --   06/24/20 1330 108/68 98  F (36.7  C) Oral -- 18 -- -- --   06/24/20 0850 119/79 97.7  F (36.5  C) Oral -- 18 -- -- --   06/24/20 0525 139/77 -- -- -- 18 -- -- --   06/24/20 0431 138/84 98.1  F (36.7  C) Oral 77 18 -- 1.626 m (5' 4\") 85.7 kg (189 lb)     General appearance: Just repositioned to left side lying with peanut ball. Much more comfortable. Can feel pressure in her \"bottom and tailbone\" with contractions.   CONTRACTIONS: every 3-5 minutes; picking up well when pt midline; most recently difficult to trace in side lying position. Adjusted   Pitocin- none,  Antibiotics- none  FETAL HEART TONES: continuous EFM- baseline 145 with moderate variability and positive accelerations. No decelerations.  ROM: not ruptured  PELVIC EXAM:deferred    # Pain Assessment:  Current Pain Score 6/24/2020   Patient currently in pain? yes   Pain descriptors Cramping;Discomfort   - Paul is experiencing pain due to pressure. Pain management was discussed and the plan was created in a collaborative fashion.  Paul's response to the current recommendations: engaged  - " improved with epidural, use KIARA bolus button        ASSESSMENT:  ==============  IUP @ 40w5d, labor  Fetal Heart Rate Tracing category one   GBS- negative     AROM @ 1510, clear fluid, afebrile    Epidural in place    TOLAC- consent signed     [facility]      Patient Active Problem List   Diagnosis     Labor and delivery, indication for care     High-risk pregnancy, unspecified trimester     Previous  section complicating pregnancy     Rh negative status during pregnancy     PLAN:  ===========  Frequent position changes to facilitate labor with epidural anesthesia.   Adjust toco to  contractions adequately. If not in a regular pattern, start pitocin.  Monitor fluid color, maternal temperature, fhr, s/s of triple I.    AKILA Kenny CNM     Addendum @ 1825:    Pt requesting SVE d/t reports of increased pressure.   SVE 6-7/90/-1. More cervix on maternal left. Caput noted, feels asynclitic.     Deceleration x 1.5 minutes to 75-90s. Repositioned to right side lying with recovery to baseline.    Contraction monitor adjusted. Pt akanksha q2-4 minutes.     Report to be given to MARIA C Mast at 1830.    AKILA Kenny CNM

## 2020-06-24 NOTE — PROGRESS NOTES
"Blood pressure 119/79, pulse 77, temperature 97.7  F (36.5  C), temperature source Oral, resp. rate 18, height 1.626 m (5' 4\"), weight 85.7 kg (189 lb).  Patient Vitals for the past 24 hrs:   BP Temp Temp src Pulse Resp Height Weight   20 0850 119/79 97.7  F (36.5  C) Oral -- 18 -- --   20 0525 139/77 -- -- -- 18 -- --   20 0431 138/84 98.1  F (36.7  C) Oral 77 18 1.626 m (5' 4\") 85.7 kg (189 lb)     General appearance: Pt reports contractions have gotten closer together over the last 1.5 hours. Feels much stronger- describes as ctx pain midline, back pain and vaginal/rectal pressure. Laying in bathtub now which she finds relaxing- has been able to close her eyes in between contractions. Desires SVE while in bathtub.     CONTRACTIONS: TOCO not adequately tracing contractions. Per  and pt report, ctx approx q3 minutes. Observed and palpated in room- ctx q3-5 minutes    Pitocin- none,  Antibiotics- none  FETAL HEART TONES: continuous EFM- baseline 135 with moderate variability and positive accelerations. Rare variable decelerations.   ROM: not ruptured  PELVIC EXAM: 5-680/-2, ant/soft    Membrane sweep performed per pt request    # Pain Assessment:  Current Pain Score 2020   Patient currently in pain? yes   - Paul is experiencing pain due to contractions. Pain management was discussed with Paul and her spouse and  and the plan was created in a collaborative fashion.  Paul's response to the current recommendations: engaged  - Nonpharmacologic; using birthing ball, cub, hydrotherapy, tennis ball, warm packs      ASSESSMENT:  ==============  IUP @ 40w5d, labor  Fetal Heart Rate Tracing category two d/t rare variable decels  GBS- negative     Cervical change    TOLAC- consent signed     [facility]     Patient Active Problem List   Diagnosis     Labor and delivery, indication for care     High-risk pregnancy, unspecified trimester     Previous  section complicating " pregnancy     Rh negative status during pregnancy     PLAN:  ===========  Cervical change noted; contractions increasing in frequency and intensity.  Pt desires continued expectant management.   Ambulation, hydration, position changes, birthing ball/sling and tub options to facilitate labor.   Consider SVE in 2-4 hours or prn per maternal/fetal indications.  May offer AROM if fetal station appropriate or pitocin for augmentation if unchanged.  Pt agreeable.    AKILA Kenny CNM     Addendum @ 1214:    Patient requesting nitrous oxide. RN to bring to room.    AKILA Kenny, MARIA C

## 2020-06-24 NOTE — PLAN OF CARE
Pt laboring in room with  and . Coping with labor pain, using nitrous at this time. AROM clear fluid. FHR reactive. Alexis 2-5 min. VSS.

## 2020-06-25 LAB
BLOOD BANK CMNT PATIENT-IMP: NORMAL
ERYTHROCYTE [DISTWIDTH] IN BLOOD BY AUTOMATED COUNT: 13.4 % (ref 10–15)
HCT VFR BLD AUTO: 31.4 % (ref 35–47)
HGB BLD-MCNC: 10.5 G/DL (ref 11.7–15.7)
MCH RBC QN AUTO: 30 PG (ref 26.5–33)
MCHC RBC AUTO-ENTMCNC: 33.4 G/DL (ref 31.5–36.5)
MCV RBC AUTO: 90 FL (ref 78–100)
PLATELET # BLD AUTO: 153 10E9/L (ref 150–450)
RBC # BLD AUTO: 3.5 10E12/L (ref 3.8–5.2)
WBC # BLD AUTO: 13.6 10E9/L (ref 4–11)

## 2020-06-25 PROCEDURE — 85027 COMPLETE CBC AUTOMATED: CPT | Performed by: ADVANCED PRACTICE MIDWIFE

## 2020-06-25 PROCEDURE — 12000001 ZZH R&B MED SURG/OB UMMC

## 2020-06-25 PROCEDURE — 25000132 ZZH RX MED GY IP 250 OP 250 PS 637: Performed by: ADVANCED PRACTICE MIDWIFE

## 2020-06-25 PROCEDURE — 36415 COLL VENOUS BLD VENIPUNCTURE: CPT | Performed by: ADVANCED PRACTICE MIDWIFE

## 2020-06-25 PROCEDURE — 86900 BLOOD TYPING SEROLOGIC ABO: CPT | Performed by: ADVANCED PRACTICE MIDWIFE

## 2020-06-25 RX ORDER — IBUPROFEN 600 MG/1
600 TABLET, FILM COATED ORAL EVERY 6 HOURS PRN
Qty: 60 TABLET | Refills: 0 | COMMUNITY
Start: 2020-06-25

## 2020-06-25 RX ORDER — MULTIVIT WITH MINERALS/LUTEIN
250 TABLET ORAL DAILY
Qty: 30 TABLET | Refills: 0 | Status: SHIPPED | OUTPATIENT
Start: 2020-06-25

## 2020-06-25 RX ORDER — ACETAMINOPHEN 325 MG/1
650 TABLET ORAL EVERY 6 HOURS PRN
Qty: 100 TABLET | Refills: 0 | COMMUNITY
Start: 2020-06-25

## 2020-06-25 RX ORDER — FERROUS SULFATE 325(65) MG
325 TABLET ORAL
Qty: 30 TABLET | Refills: 0 | Status: SHIPPED | OUTPATIENT
Start: 2020-06-25

## 2020-06-25 RX ORDER — LANOLIN ALCOHOL/MO/W.PET/CERES
1000 CREAM (GRAM) TOPICAL DAILY
Qty: 30 TABLET | Refills: 0 | Status: SHIPPED | OUTPATIENT
Start: 2020-06-25

## 2020-06-25 RX ORDER — AMOXICILLIN 250 MG
1 CAPSULE ORAL DAILY
Qty: 100 TABLET | Refills: 0 | Status: SHIPPED | OUTPATIENT
Start: 2020-06-25

## 2020-06-25 RX ADMIN — SENNOSIDES AND DOCUSATE SODIUM 1 TABLET: 8.6; 5 TABLET ORAL at 07:07

## 2020-06-25 RX ADMIN — ACETAMINOPHEN 650 MG: 325 TABLET, FILM COATED ORAL at 07:07

## 2020-06-25 RX ADMIN — IBUPROFEN 800 MG: 800 TABLET ORAL at 13:58

## 2020-06-25 RX ADMIN — ACETAMINOPHEN 650 MG: 325 TABLET, FILM COATED ORAL at 13:58

## 2020-06-25 RX ADMIN — ACETAMINOPHEN 650 MG: 325 TABLET, FILM COATED ORAL at 02:35

## 2020-06-25 RX ADMIN — ACETAMINOPHEN 650 MG: 325 TABLET, FILM COATED ORAL at 18:11

## 2020-06-25 RX ADMIN — IBUPROFEN 800 MG: 800 TABLET ORAL at 23:04

## 2020-06-25 RX ADMIN — SENNOSIDES AND DOCUSATE SODIUM 1 TABLET: 8.6; 5 TABLET ORAL at 23:04

## 2020-06-25 RX ADMIN — ACETAMINOPHEN 650 MG: 325 TABLET, FILM COATED ORAL at 23:48

## 2020-06-25 RX ADMIN — IBUPROFEN 800 MG: 800 TABLET ORAL at 02:36

## 2020-06-25 RX ADMIN — IBUPROFEN 800 MG: 800 TABLET ORAL at 09:12

## 2020-06-25 NOTE — PLAN OF CARE
Data: Paul Oh transferred to 7122 via wheelchair at 2345.  Action: Receiving unit notified of transfer: Yes. Patient and family notified of room change. Report given to Herminia TELLEZ RN at 0000. Belongings sent to receiving unit. Accompanied by Registered Nurse. Oriented patient to surroundings. Call light within reach.   Response: Patient tolerated transfer and is stable.

## 2020-06-25 NOTE — PLAN OF CARE
Data: Vital signs within normal limits. Postpartum checks within normal limits - see flow record. Patient eating and drinking normally. Patient able to empty bladder independently and is up ambulating. Perineum healing well. Patient performing self cares and is able to care for infant.  Action: Patient medicated during the shift for perineum soreness.   Going down to NICU quite a bit during the day, relaxing as much as she can. Doing great - but knows to speak up if she needs a break.   Will continue to monitor and provide support.

## 2020-06-25 NOTE — PROVIDER NOTIFICATION
06/24/20 7137   Provider Notification   Provider Name/Title Aly Mast CNM   Method of Notification Electronic Page   Request Evaluate-Remote   Notification Reason Lab Results   Notified MCKAYLA Mast CNM that labs are back, and that pt is feeling better after snacks, and a bolus.   visited pt and decided with pt that she could transfer up to [facility].

## 2020-06-25 NOTE — LACTATION NOTE
"This note was copied from a baby's chart.  D:  I met with Paul and Colt; Sasha is their 2nd baby.  She nursed her 1st for 20 months.  She is normally in good health, takes no medications, and has no history of breast/chest surgery or trauma.  She has already started to pump and has a Spectra through insurance.   I:  I gave her a folder of introductory materials and went over pumping guidelines.  I reviewed physiology of colostrum and milk production, pumping guidelines, and I gave her a log and encouraged her to use it.   I explained how to access the videos \"Hand Expression\" and \"Maximizing Milk Production\"; as well as other helpful books and websites.   We discussed hands-on pumping techniques and usefulness of a hands-free pumping bra.  We discussed skin to skin holding and how to reach your breastfeeding goals.  We talked about birth control and other medications during breastfeeding.  She verbalized understanding via teachback.  We talked about ways to help a sleepy baby maintain a good deep latch and supportive holds to use.  We talked about when to pump and when it is not needed.  A:  Mom has information she needs to initiate her supply.   P:  Will continue to provide lactation support.  Montserrat Parisi, RNC, IBCLC       "

## 2020-06-25 NOTE — PROGRESS NOTES
SW left message on Paul's VM introducing self and role of [facility] SW. Per bedside RN this morning, she has been in the NICU frequently caring for baby. Writer will attempt to visit again tomorrow.    DEEPAK Burgos, [facility]   Social Worker  Maternal Child Health  [facility]  Direct: [phone]  Pager: [phone]

## 2020-06-25 NOTE — L&D DELIVERY NOTE
DELIVERY NOTE:  Paul Oh is a 24 year old  at 40w5d who presented in early, prodromal labor for transfer from home birth d/t FHR decelerations.  Labor progressed spontaneously. SROM at 1510 of clear fluid. Pt progressed to complete and 0 at 1840.  Began pushing at that time with strong maternal urge. Pushed effectively with RN, CNM, and  coaching. FHR with variable decels with pushing effort, moderate variability throughout. NICU in attendance d/t decelerations. Head delivered ROT with nuchal cord x 1. Shoulders easily delivered under maternal effort, cord reduced after. Live female  delivered at  over an intact perineum under epidural anesthesia.  Spontaneous breath, weak cry, and HR>100. Infant directly to maternal abdomen, skin to skin. Delayed cord clamping for 1 minutes then clamped x2 and cut by FOB.  Baby then transferred to warmer for further evaluation by NICU team.  20 units of pitocin infusing after placenta with patient's consent at  d/t increased bleeding. Cord blood obtained for typing. Cord segment for gases. Intact placenta spontaneously delivered via Saavedra at .  3 vessel cord. Fundus midline and firm after massage. Vagina, perineum, and rectum inspected, sulcal and bilateral labial lacerations noted.  Infiltrated with 1% lidocaine and repaired with 3-0 vicryl suture, multiple interrupted stitches. Hemostasis noted. Mother and infant stable; continued skin to skin. Good family bonding observed.     Delivery Note:   IUP at 40 weeks gestation delivered on 2020.     delivery of a viable Female infant.  Weight : pending  Apgars of 8 at 1 minute and 9 at 5 minutes.  Labor was spontaneous.  Medications administered  in labor:  Pain Rx Epidural and Nitrous Oxide; Antibiotics No; Other n/a  Perineum: bilateral labial laceration, Vaginal Laceration  Placenta-mechanism: spontaneous, intact,  with a 3 vessel cord. IV oxytocin was given After delivery of  placenta with patient's consent.  Quantitative Blood Loss was 580cc.   Complications of labor and delivery: Category two FHT tracing, Dysfunctional Labor and Nuchal cord  Anticipated Discharge Date: 20  Birth attendants: MARIA C Esqueda APRN CNM    Delivery Summary    Paul Oh MRN# [id number]   Age: 24 year old YOB: 1995     ASSESSMENT & PLAN:          Labor Event Times    Labor onset date:  20 Onset time:  11:50 AM   Dilation complete date:  20 Complete time:   6:40 PM   Start pushing date/time:  2020 1840      Labor Events     labor?:  No   steroids:  None  Labor Type:  AROM  Predominate monitoring during 1st stage:  continuous electronic fetal monitoring     Antibiotics received during labor?:  No     Rupture date/time: 20 1510   Rupture type:  Artificial Rupture of Membranes  Fluid color:  Clear  Fluid odor:  Normal      Labor partogram used?:  no      Delivery/Placenta Date and Time    Delivery Date:  20 Delivery Time:   7:53 PM   Placenta Date/Time:  2020  7:58 PM     Vaginal Counts     Initial count performed by 2 team members:   Two Team Members   MARIA C Alvares RN       Needles Suture [place] [employer]   Initial counts 2  5    Added to count  1 5    Final counts       Placed during labor Accounted for at the end of labor   No NA   No NA   No NA           Apgars    Living status:  Living   1 Minute 5 Minute 10 Minute 15 Minute 20 Minute   Skin color: 1  1       Heart rate: 2  2       Reflex irritability: 2  2       Muscle tone: 2  2       Respiratory effort: 1  2       Total: 8  9       Apgars assigned by:  AKILA GUTIERREZ-CNP     Cord    Vessels:  3 Vessels    Cord Blood Disposition:  Lab Gases Sent?:  Yes       Resuscitation    [place] Care at Delivery:  Called to attend delivery by Romeo Diaz CNM for decelerations. Infant delivered and placed on mom's chest. Infant dried and  stimulated. Infant pale pink, coughing and sneezing with heart rate >100. Cord clamped after one minute and taken to open warmer. Continued to dry and stimulate and infant initiated a lusty cry. Color and tone improved. Suctioned mouth and nares for blood tinged fluid. Infant without work of breathing or distress. Bundled with hat and given back to mom. Gross PE remarkable for head molding.     AKILA Wilkinson-CNP, NNP, 6/24/2020 8:14 PM  [facility]    Output in Delivery Room:  Voided     Skin to Skin and Feeding Plan    Skin to skin initiation date/time: 1/6/1841    Skin to skin with:  Mother  Skin to skin end date/time: 1/6/1841    How do you plan to feed your baby:  Breastfeeding     Delivery (Maternal) (Provider to Complete) ([id number])       Blood Loss  Mother: AdrianoPaul #[id number]   Start of Mother's Information    IO Blood Loss  06/24/20 1150 - 06/24/20 2039    None           End of Mother's Information  Mother: AdrianoPaul #[id number]         Delivery - Provider to Complete ([id number])     Other personnel:   Provider Role   Aly Mast APRN CNM Certified Nurse Midwife   Carmen Maldonado, RN Registered Nurse   Elvia العلي, RN Registered Nurse         Placenta    Date/Time:  6/24/2020  7:58 PM     Anesthesia    Method:  Epidural, Local  Cervical dilation at placement:  4-7               AKILA Branham CNM

## 2020-06-25 NOTE — PROVIDER NOTIFICATION
"   06/25/20 0717   Provider Notification   Provider Name/Title MD Rowland   Method of Notification Electronic Page   Request Evaluate-Remote   Notification Reason Status Update   7122 B.W just fyi pt right leg is still \"pins and needles\" feeling mostly in her Right thigh as she describes it post delivery. pt states it is improving and pt able to lift off of bed but unable to bear weight yet since delivery. thank you Agustin MILLAN [id number]  "

## 2020-06-25 NOTE — PROGRESS NOTES
"Postpartum Day #1 Note:  SIGNIFICANT PROBLEMS:  Patient Active Problem List    Diagnosis Date Noted     Labor and delivery, indication for care 2020     Priority: Medium     High-risk pregnancy, unspecified trimester 2020     Priority: Medium     Patient with planned Out of Hospital Birth is transferring by car to the hospital for fetal monitoring   Patient has been seen by JULIA for prenatal care.  Transferring midwife name(s),/birth center & phone number: JORDYN   Midwife here supporting patient: not here due to COVID   Records received, reviewed and scanned into chart.          Previous  section complicating pregnancy 2020     Priority: Medium     Rh negative status during pregnancy 2020     Priority: Medium      (vaginal birth after ) 2020     Priority: Medium       INTERVAL HISTORY:  /82   Pulse 77   Temp 98.7  F (37.1  C) (Oral)   Resp 16   Ht 1.626 m (5' 4\")   Wt 85.7 kg (189 lb)   LMP  (LMP Unknown)   SpO2 99%   BMI 32.44 kg/m    Pt stable, baby is in NICU. Baby Sasha was taken to NICU ~2 hours after delivery due to an elevated temperature and tachypnea. Pt reports she is doing well.     Breast feeding status: initiated. Felt the latch was too shallow with the first couple feedings. Reports some nipple pain and bleeding. States the last two feedings had deeper latch and were less painful. Is interested in lactation consult. Reports feeding first baby for 20 months.     Complications since 2 hours post delivery: Reports paresthesia in right thigh, but feels like it is slowly improving. Used assist of 1 to ambulate to bathroom. Does feel urge/bladder fullness and is voiding spontaneously. No BM yet. Pt states she used castor oil to induce labor and felt she was \"pretty cleaned out.\"  Encouraged stool softener, pt agrees. No dizziness or lightheadedness since yesterday's syncope episode. Denies HA and SOB.     Cramping is minimal, lochia is " "decreasing and patient denies clots.  Perineal pain is relieved by ibuprofen and Tylenol.       Physical Exam:  Breasts: soft, nontender, nipples slightly pink, no visible crack    Abdomen: soft, nontender, fundus at -1  Lochia: rubra amount, light, no clots or odor  Perineum laceration is well approximated, small amount bruising is noted on right and slight edema   Extremities: no edema, no redness, warmth, or pain    Postpartum hemoglobin   Hemoglobin   Date Value Ref Range Status   06/25/2020 10.5 (L) 11.7 - 15.7 g/dL Final     Blood type   Lab Results   Component Value Date    ABO A 06/24/2020       Lab Results   Component Value Date    RH Neg 06/24/2020     Rubella status   Lab Results   Component Value Date    RUBELLAABIGG immune 11/19/2019     History of depression: none. Postpartum depression warning signs reviewed.    ASSESSMENT/PLAN:  Normal postpartum exam, stable Post-partum day #1  Complications:  - Anemic, plan for iron supplementation. Rx ordered for discharge, along with stool softener.   - Breastfeeding difficulties- contacted NICU lactation consult. They will do a visit today.   Plan d/c home tomorrow. Home Visit Ordered- No  Postpartum Visits: Reviewed 2 week (can be by phone) and 6 week visit (in clinic). Pt states they live far away so plan to contact the home birth midwife they had been seeing for prenatal care. They hope to have her follow PP.  states, \"I mean the care here has been great. We'd come back if it wasn't so far away.\"   Postpartum warning s/s reviewed, including bleeding/clots, fever, mastitis, or depression  Continue prenatal vitamins  Birthcontrol planned:Lactation amenorrhea and NFP. Discussed return to fertility and difficulty with NFP while breastfeeding. Pt verbalizes understanding.     Current Discharge Medication List      START taking these medications    Details   acetaminophen (TYLENOL) 325 MG tablet Take 2 tablets (650 mg) by mouth every 6 hours as needed for " mild pain Start after Delivery.  Qty: 100 tablet, Refills: 0    Associated Diagnoses:  (vaginal birth after )      cyanocobalamin (VITAMIN B-12) 1000 MCG tablet Take 1 tablet (1,000 mcg) by mouth daily  Qty: 30 tablet, Refills: 0    Associated Diagnoses: Anemia due to blood loss, acute;  (vaginal birth after )      ferrous sulfate (FEROSUL) 325 (65 Fe) MG tablet Take 1 tablet (325 mg) by mouth daily (with breakfast)  Qty: 30 tablet, Refills: 0    Comments: Take with vitamin C and vitamin B12.  Associated Diagnoses: Anemia due to blood loss, acute;  (vaginal birth after )      ibuprofen (ADVIL/MOTRIN) 600 MG tablet Take 1 tablet (600 mg) by mouth every 6 hours as needed for moderate pain Start after delivery  Qty: 60 tablet, Refills: 0    Associated Diagnoses:  (vaginal birth after )      senna-docusate (SENOKOT-S/PERICOLACE) 8.6-50 MG tablet Take 1 tablet by mouth daily Start after delivery.  Qty: 100 tablet, Refills: 0    Associated Diagnoses:  (vaginal birth after )      vitamin C (ASCORBIC ACID) 250 MG tablet Take 1 tablet (250 mg) by mouth daily  Qty: 30 tablet, Refills: 0    Associated Diagnoses: Anemia due to blood loss, acute;  (vaginal birth after )         CONTINUE these medications which have NOT CHANGED    Details   Prenatal Vit-Fe Fumarate-FA (PNV PRENATAL PLUS MULTIVITAMIN) 27-1 MG TABS per tablet Take 1 tablet by mouth daily           AKILA Logan CNM

## 2020-06-25 NOTE — PROVIDER NOTIFICATION
06/24/20 1808   Provider Notification   Provider Name/Title Romeo BLAKELY CNM   Method of Notification At Bedside   Request Evaluate in Person   Notification Reason Decels   Provider at bedside for decels. Pt repositioned. FHR returned to baseline. Will continue to monitor closely.

## 2020-06-25 NOTE — PLAN OF CARE
"Pt transferred to wheelchair with assistance of lisa perdomo. Pt in wheelchair and stated \"I feel dizzy and nauseous\" Pt then had syncopal episode in wheelchair, 2 RNs at bedside and held pt steady in wheelchair. Pt transferred to bed with 3 RN assistance and lisa perdomo. Pt in bed and stated she started feeling better, 500mL LR bolus started. LATRELL Mast. CNM called to bedside to assess. Scant lochia expressed with fundal check. Plan is to finish LR bolus and STAT labs to be drawn. RN continue to monitor.   "

## 2020-06-25 NOTE — PLAN OF CARE
Data: Vital signs within normal limits. Postpartum checks within normal limits - see flow record. Patient eating and drinking normally. Patient able to empty bladder with assistance to bathroom with lisa steady. Pt R leg is numb and tingling from very dense epidural. No apparent signs of infection.  Perineum  healing well.   Action: Patient medicated during the shift for pain and cramping. See MAR. Patient reassessed within 1 hour after each medication and pain was improved - patient stated she was comfortable. Patient education done about mobility call for assistance and pumping for  in NICU; encouraged pt to take sitz baths when leg can support weight.   Response: Positive attachment behaviors observed with infant. Support persons present.   Plan: Anticipate discharge tomorrow .

## 2020-06-25 NOTE — PLAN OF CARE
Vaginal Delivery Note   of viable Female with MCKAYLA Mast CNM in attendance.  NICU present at delivery for FHT decels.  Infant with spontaneous cry, to mother's abdomen, dried and stimulated.  APGAR at 1 minute:  8.  Placenta delivered with out complication, oxytocin started after delivery of placenta, labial and vaginal laceration, with repair, princess cares provided.  Mother and baby in stable condition.

## 2020-06-25 NOTE — PROGRESS NOTES
"Blood pressure 122/75, pulse 77, temperature 98.3  F (36.8  C), temperature source Oral, resp. rate 18, height 1.626 m (5' 4\"), weight 85.7 kg (189 lb), SpO2 100 %.    General appearance: uncomfortable with contractions, feeling increased pressure and urge to push.  CONTRACTIONS: every 1-3 minutes and moderate  Pitocin- none,  Antibiotics- none  FETAL HEART TONES: continuous EFM- baseline 150 with moderate variability and positive accelerations. Variable decelerations.  ROM: clear fluid  PELVIC EXAM: anterior lip, which was easily reduced with maternal pushing efforts    ASSESSMENT:  ==============  IUP @ 40w5d in second stage   Fetal Heart Rate Tracing category two  GBS- negative    PLAN:  ===========  Continue intrauterine resuscitative measures as needed for Cat II tracing  Frequent position changes to facilitate fetal descent and rotation with epidural anesthesia.  Begin pushing      AKILA Branham CNM    "

## 2020-06-25 NOTE — PROGRESS NOTES
Called to room after episode of syncope after getting up to transfer to wheel chair.  Patient was moved back to bed.  VSS, pale in appearance. Bleeding within normal limits, no large gushes or clots expressed. Bladder emptied. Fluid bolus initiated. CBC stat ordered.  Pt has eaten little since delivery.  Encouraged to eat and drink.  Will re-evaluate with lab results.  -AKILA Branham CNM    Addendum 7505:  Pt feeling better, more color in cheeks. Has eaten some crackers, peanut butter, and apple juice. CBC results showed Hgb 11.3 (admit 13.4), plts 147 (admit 165).  Discussed potential drop and effect on body. Plan to repeat in the AM. Encouraged to rest, hydrate, and continue to eat. Advised to ask for assistance when need to get out of bed or use restroom. Answered all patient and partner's questions. Pt verbalized understanding and agreement to plan. -AKILA Branham CNM

## 2020-06-26 ENCOUNTER — LACTATION ENCOUNTER (OUTPATIENT)
Age: 25
End: 2020-06-26

## 2020-06-26 VITALS
WEIGHT: 189 LBS | DIASTOLIC BLOOD PRESSURE: 70 MMHG | OXYGEN SATURATION: 99 % | SYSTOLIC BLOOD PRESSURE: 118 MMHG | RESPIRATION RATE: 16 BRPM | BODY MASS INDEX: 32.27 KG/M2 | HEIGHT: 64 IN | HEART RATE: 85 BPM | TEMPERATURE: 98.1 F

## 2020-06-26 PROCEDURE — 25000132 ZZH RX MED GY IP 250 OP 250 PS 637: Performed by: ADVANCED PRACTICE MIDWIFE

## 2020-06-26 RX ADMIN — ACETAMINOPHEN 650 MG: 325 TABLET, FILM COATED ORAL at 04:55

## 2020-06-26 RX ADMIN — SENNOSIDES AND DOCUSATE SODIUM 1 TABLET: 8.6; 5 TABLET ORAL at 09:42

## 2020-06-26 RX ADMIN — ACETAMINOPHEN 650 MG: 325 TABLET, FILM COATED ORAL at 09:41

## 2020-06-26 RX ADMIN — IBUPROFEN 800 MG: 800 TABLET ORAL at 04:55

## 2020-06-26 NOTE — PLAN OF CARE
VSS. Afebrile. Up ad nuria. Showered. No PIV found. Going to NICU every 2-3hrs to breast feed baby. Voiding and passing gas. C/o minimal pain and medicated with relief. Discharge instructions and meds reviewed and given to pt. Pt is discharged and will board until early afternoon today when baby in NICU will be discharged.

## 2020-06-26 NOTE — LACTATION NOTE
This note was copied from a baby's chart.  D: I met with mom for discharge teaching.   I: I gave her a feeding log to use at home and went over the need for 8-12 feedings per day and how many wet diapers and stools she should see each day to show adequate intake. We discussed home storage of breast milk.  I gave the mother handouts on all of these topics. We discussed growth spurts, medications, and resources for help at home/ when to seek outpatient help.  She verbalized understanding via teach back. She stated Sasha is now nursing comfortably and having appropriate output for age.  A: Mom has information and equipment she needs to feed her baby at home.   P: I encouraged her to call with any breastfeeding questions she may have in the future.

## 2020-06-26 NOTE — PROVIDER NOTIFICATION
Pt states baby will be discharged after last round of antibiotics at 12. Can you put in discharge orders. She can board until 12.  ROSALINDA Whitehead CNM text paged and updated.

## 2020-06-26 NOTE — DISCHARGE SUMMARY
Post Partum Note and Discharge Summary  SIGNIFICANT PROBLEMS:  Patient Active Problem List    Diagnosis Date Noted     Labor and delivery, indication for care 2020     Priority: Medium     High-risk pregnancy, unspecified trimester 2020     Priority: Medium     Patient with planned Out of Hospital Birth is transferring by car to the hospital for fetal monitoring   Patient has been seen by JULIA for prenatal care.  Transferring midwife name(s),/birth center & phone number: [facility]ORD[facility]   Midwife here supporting patient: not here due to COVID   Records received, reviewed and scanned into chart.          Previous  section complicating pregnancy 2020     Priority: Medium     Rh negative status during pregnancy 2020     Priority: Medium      (vaginal birth after ) 2020     Priority: Medium       ADMISSION DIAGNOSIS:  Labor and Delivery   DISCHARGE DIAGNOSIS:     HOSPITAL COURSE:  41w0d  Paul Oh is a 24 year old female     Pt [facility] on 2020  3:44 AM  in in early labor.   Paul Oh is a 24 year old  at 40w5d TOLAC who presented in early, prodromal labor for transfer from home birth d/t FHR decelerations.  Labor progressed spontaneously. SROM at 1510 of clear fluid. Pt progressed to complete and 0 at 1840.  Began pushing at that time with strong maternal urge. Pushed effectively with RN, CNM, and  coaching. FHR with variable decels with pushing effort, moderate variability throughout. NICU in attendance d/t decelerations. Head delivered ROT with nuchal cord x 1. Shoulders easily delivered under maternal effort, cord reduced after. Live female  delivered at  over an intact perineum under epidural anesthesia.  Spontaneous breath, weak cry, and HR>100. Infant directly to maternal abdomen, skin to skin. Delayed cord clamping for 1 minutes then clamped x2 and cut by FOB.  Baby then transferred to warmer for  "further evaluation by NICU team.  20 units of pitocin infusing after placenta with patient's consent at  d/t increased bleeding. Cord blood obtained for typing. Cord segment for gases. Intact placenta spontaneously delivered via Saavedra at 195.  3 vessel cord. Fundus midline and firm after massage. Vagina, perineum, and rectum inspected, sulcal and bilateral labial lacerations noted.  Infiltrated with 1% lidocaine and repaired with 3-0 vicryl suture, multiple interrupted stitches. Hemostasis noted. Mother and infant stable; continued skin to skin. Good family bonding observed.      Delivery Note:   IUP at 40 weeks gestation delivered on 2020.     delivery of a viable Female infant.  Weight : 6-3  Apgars of 8 at 1 minute and 9 at 5 minutes.  Labor was spontaneous.  Medications administered  in labor:  Pain Rx Epidural and Nitrous Oxide; Antibiotics No; Other n/a  Perineum: bilateral labial laceration, Vaginal Laceration  Placenta-mechanism: spontaneous, intact,  with a 3 vessel cord. IV oxytocin was given After delivery of placenta with patient's consent.  Quantitative Blood Loss was 580cc.   Complications of labor and delivery: Category two FHT tracing, Dysfunctional Labor and Nuchal cord  Anticipated Discharge Date: 20  Birth attendants: MARIA C Esqueda APRN CNM       INTERVAL HISTORY:  /65   Pulse 84   Temp 97.6  F (36.4  C) (Oral)   Resp 24   Ht 1.626 m (5' 4\")   Wt 85.7 kg (189 lb)   LMP  (LMP Unknown)   SpO2 99%   BMI 32.44 kg/m    Pt stable, baby is in NICU and doing well. Uncertain if baby will be discharged today  Pt lives 1.5 hours away and is requesting boarding room if baby needs to stay. Her grandparents live with her and are able to help at home.  Pt feels well. Paresthesia gone from lower extremities, ambulating independently.  Planning on having postpartum home visits with her homebirth midwife. Knows she can contact us at any time  Breast feeding " status:initiated and going well. Getting lactation help in NICU  # Discharge Pain Plan:   - During her hospitalization, Paul experienced pain due to .  The pain plan for discharge was discussed with Paul and her spouse and the plan was created in a collaborative fashion.    - tylenol, ibuprofen, tub baths    Complications since 2 hours post delivery: None  Patient is tolerating activity well, Voiding without difficulty, cramping is relieved by Ibuprophen, lochia is decreasing and patient denies clots.  Perineal pain is is relieved by Ibuprophen.  The perineum laceration is well approximated    Physical Exam:  General: Bright affect. Family supportive.   Breasts: soft, nontender, nipples intact,   Abdomen: soft, nontender, fundus below U.   Lochia: small amount, rubra, no clots or odor.   Perineum: well-approximated.   Extremities: no edema, Cathy's negative.     Postpartum hemoglobin   Hemoglobin   Date Value Ref Range Status   2020 10.5 (L) 11.7 - 15.7 g/dL Final      Prenatal Labs:   Lab Results   Component Value Date    ABO A 2020    RH Neg 2020    AS Pos (A) 2020    HEPBANG negative 2019     Rubella: immune  Not suitable for rhogam  History of depression: none. Postpartum depression warning signs reviewed.    ASSESSMENT/PLAN:  Normal postpartum exam , Stable Post-partum day #2  Complications:baby in NICU, may need boarder status  Plan d/c to home or boarder status depending on baby status today. Home Visit Ordered- Yes: baby was in NICU  RTC 2 weeks or sooner with her homebirth midwife. May call and come to [facility] in 2 wks  Postpartum warning s/s reviewed, including bleeding/clots, fever, mastitis, or depression  Kegels/ crunches  Continue prenatal vitamins  Birthcontrol planned:NFP, fertility reviewed  PROCEDURES:      Current Discharge Medication List      START taking these medications    Details   acetaminophen (TYLENOL) 325 MG tablet Take 2 tablets (650 mg) by mouth  every 6 hours as needed for mild pain Start after Delivery.  Qty: 100 tablet, Refills: 0    Associated Diagnoses:  (vaginal birth after )      cyanocobalamin (VITAMIN B-12) 1000 MCG tablet Take 1 tablet (1,000 mcg) by mouth daily  Qty: 30 tablet, Refills: 0    Associated Diagnoses: Anemia due to blood loss, acute;  (vaginal birth after )      ferrous sulfate (FEROSUL) 325 (65 Fe) MG tablet Take 1 tablet (325 mg) by mouth daily (with breakfast)  Qty: 30 tablet, Refills: 0    Comments: Take with vitamin C and vitamin B12.  Associated Diagnoses: Anemia due to blood loss, acute;  (vaginal birth after )      ibuprofen (ADVIL/MOTRIN) 600 MG tablet Take 1 tablet (600 mg) by mouth every 6 hours as needed for moderate pain Start after delivery  Qty: 60 tablet, Refills: 0    Associated Diagnoses:  (vaginal birth after )      senna-docusate (SENOKOT-S/PERICOLACE) 8.6-50 MG tablet Take 1 tablet by mouth daily Start after delivery.  Qty: 100 tablet, Refills: 0    Associated Diagnoses:  (vaginal birth after )      vitamin C (ASCORBIC ACID) 250 MG tablet Take 1 tablet (250 mg) by mouth daily  Qty: 30 tablet, Refills: 0    Associated Diagnoses: Anemia due to blood loss, acute;  (vaginal birth after )         CONTINUE these medications which have NOT CHANGED    Details   Prenatal Vit-Fe Fumarate-FA (PNV PRENATAL PLUS MULTIVITAMIN) 27-1 MG TABS per tablet Take 1 tablet by mouth daily           AKILA Ovalle CNM

## 2020-06-26 NOTE — LACTATION NOTE
"This note was copied from a baby's chart.  Discharge Instructions for Paul and Sasha Oh    Pumping:  Pump only if Sasha is needing supplementation.    Medications: Always get a second opinion from a lactation consultant if told to stop breastfeeding or \"pump and dump\" with an illness, when starting a new medication or having a procedure; most situations are compatible.    Growth Spurts: Common times for \"growth spurts\" are around 7-10 days, 2-3 weeks, 4-6 weeks, 3 months, 4 months, 6 months and 9 months, but these vary widely between babies.  During these times allow your baby to nurse very frequently (or pump more frequently) to temporarily boost your supply, as opposed to supplementing.  It should pass in a few days when your supply increases, and your baby will settle into a new feeding pattern.    Outpatient [place] Lactation Resources 1-855-MGVZHEYB:   [facility]    [phone]  Breastfeeding Connection at [facility] [place] [facility]  [phone]   Breastfeeding Connection at [facility]   [phone]  [facility] Lactation [facility]    [phone]  [facility]       [phone]  [facility]      [phone]  [facility]      [phone]  [facility]      [phone]    [facility]       [phone]    BabyCafes (www.babycafeusa.org):  [facility]    Other Lactaton Help:  [facility] Parent[facility] Yanci/ Leeanne Mahmood (Tues/Wed)   [phone]  [place] Baby Weigh In (various times and locations)  www.GogoCoin  Chocolate Milk Club:  http://www.SkycheckinmidwiferBolt HR.Track/chocolate-milk-club/  DIVA Moms (Dynamic Involved Valued  Moms) [phone]  Everyday " "Miracles       https://www.everyday-miracles.org/  [facility] [place] (Thurs 2:30-3:30)   [phone]  [place] [facility] [place]    See Facebook site  [facility] \"Well Fed\" postpartum group ([place]) [phone]   [facility] ([place])   www.[facility] SystemCommutePays.Next 2 Greatness/      [facility] Lactation Support:  [facility] Outpatient Lactation Clinics Phone: [phone]  Locations: [facility], [facility], [facility] clinics  Clinic hours: Monday - Friday 8 am to 4 pm - Closed all major holidays.  Phone calls answered: Monday - Friday between 9 am and 2 pm.  Phone calls after hours: Leave a message and your call will be returned the next business  day. You can also talk with a [facility] Care Connection Triage Nurse by calling [phone].   [facility] Home Care: home nurse visit for mother band baby: [phone]    More In-Person and Online Support    WIC (call for eligibility information):  [phone]    La Leche League [place]   www.[place].org  1-014-9-LA-LECHE (274-675-5637)    Office on Women's Health National Breastfeeding Help Line:  8am to 5pm, English and Lao 1-424.705.1845 option 1  https://www.womenshealth.gov/breastfeeding/   International Breastfeeding [place] ([place])-- http://ibconline.ca/  Dimitri-- up to date lactation information: www.kellymom.com  Xqol4Azuy Tiffanie (free on [employer] store or Google Play)  [place] Breastfeeding Coalition: www.mnbreastfeedingcoaltion.org   [place] Department of Health: www.health.state.[place]/divs/oshii/bf/   Childbirth Collective https://www.childbirthcollective.org/  Drugs and lactation database:  https://toxnet.nlm.nih.gov/newtoxnet/lactmed.htm   LactMed Tiffanie (free on [employer] tiffanie store or Google Play)  [facility] is available to answer questions about the use of medications during pregnancy and while breastfeeding. 881.415.8976 www."[employer]     Rashida Andre" RNC, IBCLC/ Jojo Baumann RNC, IBCLC/ Montserrat Parisi RNC, IBCLC [phone]

## 2020-06-26 NOTE — PLAN OF CARE
Data: Vital signs within normal limits. Postpartum checks within normal limits - see flow record. Patient eating and drinking normally. Patient able to empty bladder independently and is up ambulating. Patient performing self cares and going to NICU every 2-3 hours to see baby and breastfeed.   Action: Patient medicated during the shift for pain/soreness. See MAR. Patient reassessed within 1 hour after each medication and pain was improved - patient stated she was comfortable.   Response:  Support person, , Mamadou, present.   Plan: Anticipate discharge today by 1100.

## 2020-06-26 NOTE — PLAN OF CARE
Taking Ibuprofen and Tylenol for pain, took sitz bath tonight.  Going down to NICU to breastfeed every two to three hours.  Possible discharge tomorrow.

## 2020-08-10 ENCOUNTER — TELEPHONE (OUTPATIENT)
Dept: OBGYN | Facility: CLINIC | Age: 25
End: 2020-08-10

## 2020-08-10 NOTE — TELEPHONE ENCOUNTER
----- Message from Monica Sarmiento sent at 8/10/2020  3:14 PM CDT -----  Regarding: Paper work  Contact: [phone]  [facility]    Phone Message    May a detailed message be left on voicemail: yes     Reason for Call: Form or Letter   Type or form/letter needing completion: short term disability paper work, has some questions for who to fill It out   Provider: king truong   Date form needed: asap  Once completed: Fax form to: n/a      Action Taken: Message routed to:  Other: womens health     Travel Screening: Not Applicable

## 2020-08-10 NOTE — TELEPHONE ENCOUNTER
nadiya paperwork completed signed faxed emailed and copied          email given she will send it  Return to work 8-3-20 claim [id number]          ----- Message from Monica Sarmiento sent at 8/10/2020  3:14 PM CDT -----  Regarding: Paper work  Contact: [phone]  [facility]    Phone Message    May a detailed message be left on voicemail: yes     Reason for Call: Form or Letter   Type or form/letter needing completion: short term disability paper work, has some questions for who to fill It out   Provider: king truong   Date form needed: asap  Once completed: Fax form to: n/a      Action Taken: Message routed to:  Other: womens health     Travel Screening: Not Applicable

## 2020-08-24 ENCOUNTER — TELEPHONE (OUTPATIENT)
Dept: OBGYN | Facility: CLINIC | Age: 25
End: 2020-08-24

## 2020-08-24 NOTE — TELEPHONE ENCOUNTER
Received email from Paul asking if her leave paperwork was completed yet.    Review of Epic shows this was done on 8/10 and it may have been emailed to her to send in to leave company.    Tried to reach Paul but received voicemail.  Left message regarding above. Informed that email may have gone to her spam folder as the sender shows up as a combination of letters and numbers.  If you do not find the document in your email, please call triage back at [phone].

## 2021-08-15 ENCOUNTER — HEALTH MAINTENANCE LETTER (OUTPATIENT)
Age: 26
End: 2021-08-15

## 2021-10-10 ENCOUNTER — HEALTH MAINTENANCE LETTER (OUTPATIENT)
Age: 26
End: 2021-10-10

## 2022-08-12 ENCOUNTER — LAB REQUISITION (OUTPATIENT)
Dept: LAB | Facility: CLINIC | Age: 27
End: 2022-08-12

## 2022-08-12 DIAGNOSIS — O36.0999 MATERNAL CARE FOR OTHER RHESUS ISOIMMUNIZATION, UNSPECIFIED TRIMESTER, OTHER FETUS: ICD-10-CM

## 2022-08-12 LAB
ANTIBODY SCREEN: NEGATIVE
SPECIMEN EXPIRATION DATE: NORMAL

## 2022-08-12 PROCEDURE — 86850 RBC ANTIBODY SCREEN: CPT | Performed by: OBSTETRICS & GYNECOLOGY

## 2022-09-20 ENCOUNTER — LAB REQUISITION (OUTPATIENT)
Dept: LAB | Facility: CLINIC | Age: 27
End: 2022-09-20
Payer: COMMERCIAL

## 2022-09-20 DIAGNOSIS — Z36.89 ENCOUNTER FOR OTHER SPECIFIED ANTENATAL SCREENING: ICD-10-CM

## 2022-09-20 PROCEDURE — 87653 STREP B DNA AMP PROBE: CPT | Mod: ORL | Performed by: ADVANCED PRACTICE MIDWIFE

## 2022-09-21 LAB — GP B STREP DNA SPEC QL NAA+PROBE: NEGATIVE

## 2022-09-24 ENCOUNTER — HEALTH MAINTENANCE LETTER (OUTPATIENT)
Age: 27
End: 2022-09-24

## 2023-10-08 ENCOUNTER — HEALTH MAINTENANCE LETTER (OUTPATIENT)
Age: 28
End: 2023-10-08

## 2025-04-08 ENCOUNTER — LAB REQUISITION (OUTPATIENT)
Dept: LAB | Facility: CLINIC | Age: 30
End: 2025-04-08
Payer: COMMERCIAL

## 2025-04-08 DIAGNOSIS — Z12.4 ENCOUNTER FOR SCREENING FOR MALIGNANT NEOPLASM OF CERVIX: ICD-10-CM

## 2025-04-08 DIAGNOSIS — Z36.9 ENCOUNTER FOR ANTENATAL SCREENING, UNSPECIFIED: ICD-10-CM

## 2025-04-08 LAB
HBV SURFACE AG SERPL QL IA: NONREACTIVE
HCV AB SERPL QL IA: NONREACTIVE
HPV HR 12 DNA CVX QL NAA+PROBE: NEGATIVE
HPV16 DNA CVX QL NAA+PROBE: NEGATIVE
HPV18 DNA CVX QL NAA+PROBE: NEGATIVE
HUMAN PAPILLOMA VIRUS FINAL DIAGNOSIS: NORMAL

## 2025-04-08 PROCEDURE — 87340 HEPATITIS B SURFACE AG IA: CPT

## 2025-04-08 PROCEDURE — 87086 URINE CULTURE/COLONY COUNT: CPT

## 2025-04-08 PROCEDURE — G0145 SCR C/V CYTO,THINLAYER,RESCR: HCPCS

## 2025-04-08 PROCEDURE — 87624 HPV HI-RISK TYP POOLED RSLT: CPT

## 2025-04-08 PROCEDURE — 86780 TREPONEMA PALLIDUM: CPT

## 2025-04-08 PROCEDURE — 86803 HEPATITIS C AB TEST: CPT

## 2025-04-08 PROCEDURE — 86762 RUBELLA ANTIBODY: CPT

## 2025-04-09 LAB
BACTERIA UR CULT: NORMAL
RUBV IGG SERPL QL IA: 2.43 INDEX
RUBV IGG SERPL QL IA: POSITIVE
T PALLIDUM AB SER QL: NONREACTIVE

## 2025-04-10 ENCOUNTER — LAB REQUISITION (OUTPATIENT)
Dept: LAB | Facility: CLINIC | Age: 30
End: 2025-04-10

## 2025-04-10 DIAGNOSIS — Z36.9 ENCOUNTER FOR ANTENATAL SCREENING, UNSPECIFIED: ICD-10-CM

## 2025-04-10 LAB
ABO + RH BLD: NORMAL
BASOPHILS # BLD AUTO: 0 10E3/UL (ref 0–0.2)
BASOPHILS NFR BLD AUTO: 1 %
BKR AP ASSOCIATED HPV REPORT: NORMAL
BKR LAB AP GYN ADEQUACY: NORMAL
BKR LAB AP GYN INTERPRETATION: NORMAL
BKR LAB AP LMP: NORMAL
BKR LAB AP PREVIOUS ABNL DX: NORMAL
BKR LAB AP PREVIOUS ABNORMAL: NORMAL
BLD GP AB SCN SERPL QL: NEGATIVE
EOSINOPHIL # BLD AUTO: 0.2 10E3/UL (ref 0–0.7)
EOSINOPHIL NFR BLD AUTO: 3 %
ERYTHROCYTE [DISTWIDTH] IN BLOOD BY AUTOMATED COUNT: 13.2 % (ref 10–15)
EST. AVERAGE GLUCOSE BLD GHB EST-MCNC: 103 MG/DL
HBA1C MFR BLD: 5.2 %
HCT VFR BLD AUTO: 39.5 % (ref 35–47)
HGB BLD-MCNC: 13.1 G/DL (ref 11.7–15.7)
HIV 1+2 AB+HIV1 P24 AG SERPL QL IA: NONREACTIVE
IMM GRANULOCYTES # BLD: 0 10E3/UL
IMM GRANULOCYTES NFR BLD: 0 %
LYMPHOCYTES # BLD AUTO: 2 10E3/UL (ref 0.8–5.3)
LYMPHOCYTES NFR BLD AUTO: 25 %
MCH RBC QN AUTO: 28.7 PG (ref 26.5–33)
MCHC RBC AUTO-ENTMCNC: 33.2 G/DL (ref 31.5–36.5)
MCV RBC AUTO: 87 FL (ref 78–100)
MONOCYTES # BLD AUTO: 0.5 10E3/UL (ref 0–1.3)
MONOCYTES NFR BLD AUTO: 6 %
NEUTROPHILS # BLD AUTO: 5.5 10E3/UL (ref 1.6–8.3)
NEUTROPHILS NFR BLD AUTO: 66 %
NRBC # BLD AUTO: 0 10E3/UL
NRBC BLD AUTO-RTO: 0 /100
PATH REPORT.COMMENTS IMP SPEC: NORMAL
PATH REPORT.COMMENTS IMP SPEC: NORMAL
PATH REPORT.RELEVANT HX SPEC: NORMAL
PLATELET # BLD AUTO: 275 10E3/UL (ref 150–450)
RBC # BLD AUTO: 4.56 10E6/UL (ref 3.8–5.2)
SPECIMEN EXP DATE BLD: NORMAL
WBC # BLD AUTO: 8.3 10E3/UL (ref 4–11)

## 2025-04-10 PROCEDURE — 85025 COMPLETE CBC W/AUTO DIFF WBC: CPT

## 2025-04-10 PROCEDURE — 83036 HEMOGLOBIN GLYCOSYLATED A1C: CPT

## 2025-04-10 PROCEDURE — 86900 BLOOD TYPING SEROLOGIC ABO: CPT

## 2025-04-10 PROCEDURE — 87389 HIV-1 AG W/HIV-1&-2 AB AG IA: CPT

## 2025-04-10 PROCEDURE — 86850 RBC ANTIBODY SCREEN: CPT

## 2025-07-19 ENCOUNTER — HEALTH MAINTENANCE LETTER (OUTPATIENT)
Age: 30
End: 2025-07-19

## 2025-08-04 ENCOUNTER — LAB REQUISITION (OUTPATIENT)
Dept: LAB | Facility: CLINIC | Age: 30
End: 2025-08-04

## 2025-08-04 DIAGNOSIS — Z36.89 ENCOUNTER FOR OTHER SPECIFIED ANTENATAL SCREENING: ICD-10-CM

## 2025-08-04 DIAGNOSIS — O36.0920 MATERNAL CARE FOR OTHER RHESUS ISOIMMUNIZATION, SECOND TRIMESTER, NOT APPLICABLE OR UNSPECIFIED: ICD-10-CM

## 2025-08-04 LAB
BLD GP AB SCN SERPL QL: NEGATIVE
SPECIMEN EXP DATE BLD: NORMAL

## 2025-08-04 PROCEDURE — 86850 RBC ANTIBODY SCREEN: CPT

## 2025-08-04 PROCEDURE — 86780 TREPONEMA PALLIDUM: CPT

## 2025-08-05 LAB — T PALLIDUM AB SER QL: NONREACTIVE
